# Patient Record
Sex: FEMALE | Race: WHITE | Employment: FULL TIME | URBAN - METROPOLITAN AREA
[De-identification: names, ages, dates, MRNs, and addresses within clinical notes are randomized per-mention and may not be internally consistent; named-entity substitution may affect disease eponyms.]

---

## 2020-10-19 ENCOUNTER — APPOINTMENT (OUTPATIENT)
Dept: GENERAL RADIOLOGY | Age: 40
End: 2020-10-19
Attending: PHYSICIAN ASSISTANT
Payer: COMMERCIAL

## 2020-10-19 ENCOUNTER — HOSPITAL ENCOUNTER (EMERGENCY)
Age: 40
Discharge: HOME | End: 2020-10-19
Attending: EMERGENCY MEDICINE
Payer: COMMERCIAL

## 2020-10-19 VITALS
DIASTOLIC BLOOD PRESSURE: 90 MMHG | BODY MASS INDEX: 37.67 KG/M2 | SYSTOLIC BLOOD PRESSURE: 132 MMHG | RESPIRATION RATE: 18 BRPM | TEMPERATURE: 98.9 F | OXYGEN SATURATION: 96 % | HEART RATE: 86 BPM | HEIGHT: 67 IN | WEIGHT: 240 LBS

## 2020-10-19 DIAGNOSIS — M79.672 LEFT FOOT PAIN: Primary | ICD-10-CM

## 2020-10-19 PROCEDURE — 99282 EMERGENCY DEPT VISIT SF MDM: CPT

## 2020-10-19 PROCEDURE — 73630 X-RAY EXAM OF FOOT: CPT

## 2020-10-19 NOTE — ED PROVIDER NOTES
[facility] EMERGENCY DEPT    Patient Name: Wilman Burns    History of Presenting Illness     36 y.o. female with no significant PMH who presents to the emergency department with a 2-week history of L foot pain with associated numbness/tingling. Pt reports the pain started randomly a few weeks ago with no inciting injury. Pt states she is on her feet most of the day as an employee at [employer]. Pt describes constant burning pain on the anterior portion of her L foot that does not radiate. She also reports intermittent swelling and paresthesias of the area after long periods of time on her feet. Pt reports taking Tylenol and Motrin for her sx with minimal relief. Pt denies ankle pain, toe pain, calf tenderness, chest pain, or shortness of breath. Patient denies any other associated signs or symptoms. Patient denies any other complaints. Nursing notes regarding the HPI and triage nursing notes were reviewed. Prior medical records were reviewed. Past History     Past Medical History:  None     Past Surgical History:  Past Surgical History:   Procedure Laterality Date    HX CHOLECYSTECTOMY  2001    HX TONSILLECTOMY      HX TUBAL LIGATION         Family History:  History reviewed. No pertinent family history. Social History:  Social History     Tobacco Use    Smoking status: Not on file   Substance Use Topics    Alcohol use: Not on file    Drug use: Not on file       Allergies:  No Known Allergies    Patient's primary care provider (as noted in EPIC):  Ismael Weiner MD    Review of Systems   Constitutional:  Denies fever or chills. Cardiac:  Denies chest pain or palpitations. Respiratory:  Denies cough or shortness of breath. GI/ABD:  Denies abdominal pain, nausea, or vomiting. Extremity/MS:  Reports L foot pain. Neuro: Reports L foot paresthesias. Denies headache, LOC, or dizziness. Skin: Reports L foot edema. Denies rash or bruising.   All other systems negative as reviewed. Visit Vitals  BP (!) 132/90 (BP 1 Location: Right arm, BP Patient Position: Sitting)   Pulse 86   Temp 98.9 °F (37.2 °C)   Resp 18   Ht 5' 7\" (1.702 m)   Wt 108.9 kg (240 lb)   SpO2 96%   BMI 37.59 kg/m²       PHYSICAL EXAM:    CONSTITUTIONAL:  Alert, in no apparent distress. RESPIRATORY:  Chest clear, equal breath sounds, no increased respiratory effort. CARDIOVASCULAR:  Regular rate and rhythm. No murmurs, rubs, or gallops. UPPER EXT:  No evidence of deformity or injury. LOWER EXT: Mild tenderness to palpation of distal metatarsal region, 3rd-5th. No decreased ROM of toes or ankle. No calf tenderness. Mild edema of anterior foot without erythema. Distal pulses intact. NEURO:  Moves all four extremities, and grossly normal motor exam. Intact sensation of L foot. SKIN:  No evidence of injury or ecchymoses of L foot. No rashes. DIFFERENTIAL DIAGNOSES/ MEDICAL DECISION MAKING:  Contusion, sprain, dislocation, fracture, ligamentous tear/ disruption or a combination of the above. Xray left foot: NAD     IMPRESSION AND MEDICAL DECISION MAKING:  Based upon the patients presentation with noted HPI and PE, along with the work up done in the emergency department, I believe that the patient is having noted foot pain, likely from walking a lot at work. No sign of acute process. Will have her take ibuprofen, rice, follow-up with podiatry. Diagnosis:   1.  Left foot pain      Disposition: Discharge    Follow-up Information     Follow up With Specialties Details Why Contact Info    [address]  In 3 days  [address] [facility] [address] [facility] EMERGENCY DEPT Emergency Medicine  If symptoms worsen [address] [place] [address]

## 2020-10-19 NOTE — DISCHARGE INSTRUCTIONS

## 2020-10-19 NOTE — ED TRIAGE NOTES
Pt ambulatory to triage c/o left foot pain to top of 3rd, 4th, and 5th toe x 2wks. Denies trauma. States she works on her feet.  Minimal relief from motrin/tylenol

## 2020-10-19 NOTE — LETTER
[address] [facility] EMERGENCY DEPT 
[address] 
[address] [facility] [id number] [phone] Work/School Note Date: 10/19/2020 To Whom It May concern: 
 
Carrie Gonzalez was seen and treated today in the emergency room by the following provider(s): 
Attending Provider: Lady Linnea MD 
Physician Assistant: CLIF Lozada.   
 
Carrie Gonzalez may return to work on 10/21/2020. Sincerely, CLIF Hernandez